# Patient Record
Sex: FEMALE | Race: WHITE
[De-identification: names, ages, dates, MRNs, and addresses within clinical notes are randomized per-mention and may not be internally consistent; named-entity substitution may affect disease eponyms.]

---

## 2019-04-15 ENCOUNTER — HOSPITAL ENCOUNTER (OUTPATIENT)
Dept: HOSPITAL 75 - RAD | Age: 19
End: 2019-04-15
Attending: NURSE PRACTITIONER
Payer: COMMERCIAL

## 2019-04-15 DIAGNOSIS — N63.10: Primary | ICD-10-CM

## 2019-04-15 NOTE — DIAGNOSTIC IMAGING REPORT
INDICATION: Palpable fullness in the outer right breast.



Sonographic interrogation of the area of palpable abnormality in

the outer right breast was performed.



 No sonographic abnormality is seen. No solid or cystic mass is

detected.



IMPRESSION: BI-RADS category one



No sonographic abnormality is identified. Continued close

clinical and self breast exam is recommended to confirm stability

of the palpable abnormality.



ACR BI-RADS Category 0: Incomplete. (Needs additional imaging

evaluation).



Dictated by: 



  Dictated on workstation # [id number]

## 2020-09-22 ENCOUNTER — HOSPITAL ENCOUNTER (OUTPATIENT)
Dept: HOSPITAL 75 - LDRP | Age: 20
Discharge: HOME | End: 2020-09-22
Attending: FAMILY MEDICINE
Payer: COMMERCIAL

## 2020-09-22 VITALS — BODY MASS INDEX: 33.36 KG/M2 | WEIGHT: 212.53 LBS | HEIGHT: 67.01 IN

## 2020-09-22 VITALS — SYSTOLIC BLOOD PRESSURE: 124 MMHG | DIASTOLIC BLOOD PRESSURE: 74 MMHG

## 2020-09-22 DIAGNOSIS — O36.8121: Primary | ICD-10-CM

## 2020-09-22 DIAGNOSIS — Z3A.27: ICD-10-CM

## 2020-09-22 PROCEDURE — 99212 OFFICE O/P EST SF 10 MIN: CPT

## 2020-09-22 NOTE — NUR
DISCHARGE PACKET GIVEN AND EXPLAINED UNDERSTANDING VOICED, NO SS DISTRESS, PT AMBULATORY OFF 
UNIT AT THIS TIME ACCOMPANIED BY MOTHER.

## 2020-09-22 NOTE — NUR
HAI ROLDAN presented to unit via ambulatory from ED, accompanied by mother, with c/o 
DECREASED FETAL MOVEMENT. HAI ROLDAN weighed, gowned, voided, and to bed.  EFHM and 
TOCO applied, VS taken.  HAI ROLDAN oriented to bed controls, call light, TV, heat, and 
A/C controls.  further asesments per this rn.

## 2020-09-23 NOTE — PHYSICIAN QUERY-FINAL DX
KARLA KING 9/23/20 0813:


Clinic Account Progress/Dx


Physician Query:


Please give diagnosis





Please include # weeks gestation


Date of Service





Sep 22, 2020 at 20:31





LUZ GALVAN DO 9/23/20 0957:


Clinic Account Progress/Dx


DIAGNOSIS:


Diagnosis


27 week GA


Decreased fetal movement; reassuring fetal tracing











KARLA KING                    Sep 23, 2020 08:13


LUZ GALVAN DO                Sep 23, 2020 09:57

## 2020-11-15 ENCOUNTER — HOSPITAL ENCOUNTER (OUTPATIENT)
Dept: HOSPITAL 75 - LDRP | Age: 20
Discharge: HOME | End: 2020-11-15
Attending: FAMILY MEDICINE
Payer: COMMERCIAL

## 2020-11-15 VITALS — DIASTOLIC BLOOD PRESSURE: 77 MMHG | SYSTOLIC BLOOD PRESSURE: 120 MMHG

## 2020-11-15 VITALS — BODY MASS INDEX: 35.02 KG/M2 | WEIGHT: 223.11 LBS | HEIGHT: 67.01 IN

## 2020-11-15 DIAGNOSIS — O23.43: Primary | ICD-10-CM

## 2020-11-15 DIAGNOSIS — Z3A.35: ICD-10-CM

## 2020-11-15 LAB
APTT PPP: YELLOW S
BACTERIA #/AREA URNS HPF: (no result) /HPF
BILIRUB UR QL STRIP: NEGATIVE
FIBRINOGEN PPP-MCNC: (no result) MG/DL
GLUCOSE UR STRIP-MCNC: NEGATIVE MG/DL
KETONES UR QL STRIP: NEGATIVE
LEUKOCYTE ESTERASE UR QL STRIP: (no result)
NITRITE UR QL STRIP: POSITIVE
PH UR STRIP: 6.5 [PH] (ref 5–9)
PROT UR QL STRIP: NEGATIVE
RBC #/AREA URNS HPF: (no result) /HPF
SP GR UR STRIP: 1.02 (ref 1.02–1.02)
SQUAMOUS #/AREA URNS HPF: (no result) /HPF
WBC #/AREA URNS HPF: (no result) /HPF

## 2020-11-15 PROCEDURE — 81000 URINALYSIS NONAUTO W/SCOPE: CPT

## 2020-11-15 PROCEDURE — 96372 THER/PROPH/DIAG INJ SC/IM: CPT

## 2020-11-15 PROCEDURE — 87186 SC STD MICRODIL/AGAR DIL: CPT

## 2020-11-15 PROCEDURE — 99213 OFFICE O/P EST LOW 20 MIN: CPT

## 2020-11-15 PROCEDURE — 87077 CULTURE AEROBIC IDENTIFY: CPT

## 2020-11-15 PROCEDURE — 87088 URINE BACTERIA CULTURE: CPT

## 2020-11-15 NOTE — NUR
HAI ROLDAN presented to unit via  from ED, accompanied by family, with c/o VAG BLEEDING 
35 0/7 wks . HAI ROLDAN weighed, gowned, voided, and to bed.  EFHM and TOCO applied, VS 
taken.  HAI ROLDAN oriented to bed controls, call light, TV, heat, and A/C controls.

## 2020-11-15 NOTE — NUR
Discharge packet given and explained, understanding voiced, pt changing for dismissal at 
this time.

## 2020-11-16 NOTE — PHYSICIAN QUERY-FINAL DX
KARLA KING 11/16/20 0833:


Clinic Account Progress/Dx


Physician Query:


Please give diagnosis





Please include # weeks gestation


Date of Service





Nov 15, 2020 at 21:03





LUZ GALVAN DO 11/16/20 1538:


Clinic Account Progress/Dx


DIAGNOSIS:


Diagnosis


35 week


UTI











KARLA KING                    Nov 16, 2020 08:33


LUZ GALVAN DO                Nov 16, 2020 15:38

## 2020-12-17 ENCOUNTER — HOSPITAL ENCOUNTER (INPATIENT)
Dept: HOSPITAL 75 - WSO | Age: 20
LOS: 1 days | Discharge: HOME | End: 2020-12-18
Attending: FAMILY MEDICINE | Admitting: FAMILY MEDICINE
Payer: COMMERCIAL

## 2020-12-17 VITALS — DIASTOLIC BLOOD PRESSURE: 59 MMHG | SYSTOLIC BLOOD PRESSURE: 126 MMHG

## 2020-12-17 VITALS — DIASTOLIC BLOOD PRESSURE: 72 MMHG | SYSTOLIC BLOOD PRESSURE: 119 MMHG

## 2020-12-17 VITALS — SYSTOLIC BLOOD PRESSURE: 117 MMHG | DIASTOLIC BLOOD PRESSURE: 57 MMHG

## 2020-12-17 VITALS — DIASTOLIC BLOOD PRESSURE: 60 MMHG | SYSTOLIC BLOOD PRESSURE: 118 MMHG

## 2020-12-17 VITALS — SYSTOLIC BLOOD PRESSURE: 111 MMHG | DIASTOLIC BLOOD PRESSURE: 53 MMHG

## 2020-12-17 VITALS — DIASTOLIC BLOOD PRESSURE: 66 MMHG | SYSTOLIC BLOOD PRESSURE: 124 MMHG

## 2020-12-17 VITALS — SYSTOLIC BLOOD PRESSURE: 131 MMHG | DIASTOLIC BLOOD PRESSURE: 61 MMHG

## 2020-12-17 VITALS — SYSTOLIC BLOOD PRESSURE: 137 MMHG | DIASTOLIC BLOOD PRESSURE: 78 MMHG

## 2020-12-17 VITALS — SYSTOLIC BLOOD PRESSURE: 113 MMHG | DIASTOLIC BLOOD PRESSURE: 62 MMHG

## 2020-12-17 VITALS — SYSTOLIC BLOOD PRESSURE: 114 MMHG | DIASTOLIC BLOOD PRESSURE: 56 MMHG

## 2020-12-17 VITALS — DIASTOLIC BLOOD PRESSURE: 60 MMHG | SYSTOLIC BLOOD PRESSURE: 112 MMHG

## 2020-12-17 VITALS — DIASTOLIC BLOOD PRESSURE: 74 MMHG | SYSTOLIC BLOOD PRESSURE: 125 MMHG

## 2020-12-17 VITALS — DIASTOLIC BLOOD PRESSURE: 71 MMHG | SYSTOLIC BLOOD PRESSURE: 131 MMHG

## 2020-12-17 VITALS — SYSTOLIC BLOOD PRESSURE: 118 MMHG | DIASTOLIC BLOOD PRESSURE: 58 MMHG

## 2020-12-17 VITALS — SYSTOLIC BLOOD PRESSURE: 127 MMHG | DIASTOLIC BLOOD PRESSURE: 61 MMHG

## 2020-12-17 VITALS — BODY MASS INDEX: 35.09 KG/M2 | HEIGHT: 67.01 IN | WEIGHT: 223.55 LBS

## 2020-12-17 VITALS — DIASTOLIC BLOOD PRESSURE: 70 MMHG | SYSTOLIC BLOOD PRESSURE: 104 MMHG

## 2020-12-17 VITALS — SYSTOLIC BLOOD PRESSURE: 123 MMHG | DIASTOLIC BLOOD PRESSURE: 73 MMHG

## 2020-12-17 VITALS — SYSTOLIC BLOOD PRESSURE: 121 MMHG | DIASTOLIC BLOOD PRESSURE: 58 MMHG

## 2020-12-17 VITALS — DIASTOLIC BLOOD PRESSURE: 73 MMHG | SYSTOLIC BLOOD PRESSURE: 113 MMHG

## 2020-12-17 VITALS — DIASTOLIC BLOOD PRESSURE: 59 MMHG | SYSTOLIC BLOOD PRESSURE: 112 MMHG

## 2020-12-17 VITALS — DIASTOLIC BLOOD PRESSURE: 57 MMHG | SYSTOLIC BLOOD PRESSURE: 121 MMHG

## 2020-12-17 VITALS — SYSTOLIC BLOOD PRESSURE: 120 MMHG | DIASTOLIC BLOOD PRESSURE: 63 MMHG

## 2020-12-17 VITALS — SYSTOLIC BLOOD PRESSURE: 107 MMHG | DIASTOLIC BLOOD PRESSURE: 58 MMHG

## 2020-12-17 VITALS — DIASTOLIC BLOOD PRESSURE: 81 MMHG | SYSTOLIC BLOOD PRESSURE: 134 MMHG

## 2020-12-17 VITALS — DIASTOLIC BLOOD PRESSURE: 52 MMHG | SYSTOLIC BLOOD PRESSURE: 108 MMHG

## 2020-12-17 VITALS — SYSTOLIC BLOOD PRESSURE: 112 MMHG | DIASTOLIC BLOOD PRESSURE: 62 MMHG

## 2020-12-17 VITALS — DIASTOLIC BLOOD PRESSURE: 86 MMHG | SYSTOLIC BLOOD PRESSURE: 136 MMHG

## 2020-12-17 VITALS — SYSTOLIC BLOOD PRESSURE: 127 MMHG | DIASTOLIC BLOOD PRESSURE: 59 MMHG

## 2020-12-17 VITALS — SYSTOLIC BLOOD PRESSURE: 106 MMHG | DIASTOLIC BLOOD PRESSURE: 66 MMHG

## 2020-12-17 VITALS — SYSTOLIC BLOOD PRESSURE: 117 MMHG | DIASTOLIC BLOOD PRESSURE: 60 MMHG

## 2020-12-17 VITALS — DIASTOLIC BLOOD PRESSURE: 60 MMHG | SYSTOLIC BLOOD PRESSURE: 115 MMHG

## 2020-12-17 VITALS — DIASTOLIC BLOOD PRESSURE: 72 MMHG | SYSTOLIC BLOOD PRESSURE: 144 MMHG

## 2020-12-17 VITALS — DIASTOLIC BLOOD PRESSURE: 57 MMHG | SYSTOLIC BLOOD PRESSURE: 119 MMHG

## 2020-12-17 VITALS — DIASTOLIC BLOOD PRESSURE: 59 MMHG | SYSTOLIC BLOOD PRESSURE: 121 MMHG

## 2020-12-17 VITALS — DIASTOLIC BLOOD PRESSURE: 62 MMHG | SYSTOLIC BLOOD PRESSURE: 116 MMHG

## 2020-12-17 VITALS — DIASTOLIC BLOOD PRESSURE: 64 MMHG | SYSTOLIC BLOOD PRESSURE: 114 MMHG

## 2020-12-17 DIAGNOSIS — Z20.828: ICD-10-CM

## 2020-12-17 DIAGNOSIS — Z3A.39: ICD-10-CM

## 2020-12-17 LAB
BASOPHILS # BLD AUTO: 0 10^3/UL (ref 0–0.1)
BASOPHILS NFR BLD AUTO: 0 % (ref 0–10)
EOSINOPHIL # BLD AUTO: 0.1 10^3/UL (ref 0–0.3)
EOSINOPHIL NFR BLD AUTO: 1 % (ref 0–10)
HCT VFR BLD CALC: 34 % (ref 35–52)
HGB BLD-MCNC: 10.5 G/DL (ref 11.5–16)
LYMPHOCYTES # BLD AUTO: 1.7 10^3/UL (ref 1–4)
LYMPHOCYTES NFR BLD AUTO: 13 % (ref 12–44)
MANUAL DIFFERENTIAL PERFORMED BLD QL: NO
MCH RBC QN AUTO: 27 PG (ref 25–34)
MCHC RBC AUTO-ENTMCNC: 31 G/DL (ref 32–36)
MCV RBC AUTO: 86 FL (ref 80–99)
MONOCYTES # BLD AUTO: 0.7 10^3/UL (ref 0–1)
MONOCYTES NFR BLD AUTO: 5 % (ref 0–12)
NEUTROPHILS # BLD AUTO: 10.8 10^3/UL (ref 1.8–7.8)
NEUTROPHILS NFR BLD AUTO: 81 % (ref 42–75)
PLATELET # BLD: 316 10^3/UL (ref 130–400)
PMV BLD AUTO: 10 FL (ref 9–12.2)
WBC # BLD AUTO: 13.4 10^3/UL (ref 4.3–11)

## 2020-12-17 PROCEDURE — 86900 BLOOD TYPING SEROLOGIC ABO: CPT

## 2020-12-17 PROCEDURE — 85027 COMPLETE CBC AUTOMATED: CPT

## 2020-12-17 PROCEDURE — 86850 RBC ANTIBODY SCREEN: CPT

## 2020-12-17 PROCEDURE — 90707 MMR VACCINE SC: CPT

## 2020-12-17 PROCEDURE — 86901 BLOOD TYPING SEROLOGIC RH(D): CPT

## 2020-12-17 PROCEDURE — 90715 TDAP VACCINE 7 YRS/> IM: CPT

## 2020-12-17 PROCEDURE — 85025 COMPLETE CBC W/AUTO DIFF WBC: CPT

## 2020-12-17 PROCEDURE — 87635 SARS-COV-2 COVID-19 AMP PRB: CPT

## 2020-12-17 PROCEDURE — 10907ZC DRAINAGE OF AMNIOTIC FLUID, THERAPEUTIC FROM PRODUCTS OF CONCEPTION, VIA NATURAL OR ARTIFICIAL OPENING: ICD-10-PCS | Performed by: FAMILY MEDICINE

## 2020-12-17 PROCEDURE — 36415 COLL VENOUS BLD VENIPUNCTURE: CPT

## 2020-12-17 PROCEDURE — 99212 OFFICE O/P EST SF 10 MIN: CPT

## 2020-12-17 PROCEDURE — 0KQM0ZZ REPAIR PERINEUM MUSCLE, OPEN APPROACH: ICD-10-PCS | Performed by: FAMILY MEDICINE

## 2020-12-17 RX ADMIN — IBUPROFEN SCH MG: 600 TABLET ORAL at 12:37

## 2020-12-17 RX ADMIN — Medication SCH MLS/HR: at 13:05

## 2020-12-17 RX ADMIN — IBUPROFEN SCH MG: 600 TABLET ORAL at 18:21

## 2020-12-17 RX ADMIN — Medication SCH MLS/HR: at 13:06

## 2020-12-17 RX ADMIN — DOCUSATE SODIUM SCH MG: 100 CAPSULE ORAL at 20:50

## 2020-12-17 RX ADMIN — ACETAMINOPHEN SCH MG: 500 TABLET ORAL at 15:40

## 2020-12-17 RX ADMIN — ACETAMINOPHEN SCH MG: 500 TABLET ORAL at 20:50

## 2020-12-17 RX ADMIN — IBUPROFEN SCH MG: 600 TABLET ORAL at 23:49

## 2020-12-17 NOTE — HISTORY & PHYSICAL-OB
OB - Chief Complaint & HPI


Date/Time


Date of Admission:


Date of Admission:  Dec 17, 2020 at 02:26


Date seen by a Provider:  Dec 17, 2020


Time Seen by a Provider:  07:55





Chief Complaint/History


OB-Reason for Admission/Chief:  Onset of Labor


Hx :  1


Hx Para:  0


Expected Date of Delivery:  Dec 20, 2020


Gestational Age in Weeks:  39


Gestational Age in Days:  4


History of Labs


A+, Ab neg, Rub Imm, HIV/RPR/HepB NR, 1 hr GTT, GBS Neg





Allergies and Home Medications


Allergies


Coded Allergies:  


     Penicillins (Verified  Allergy, Mild, 20)





Home Medications


Cetirizine HCl 10 Mg Tablet, 10 MG PO BID PRN, (Reported)


Prenatal Vit W-Ca,Fe,FA(<1 mg) 1 Each Tablet, 1 EACH PO DAILY, (Reported)





Patient Home Medication List


Home Medication List Reviewed:  Yes





OB - History


Hx of Present Pregnancy


Prenatal Care:  Yes


Ultrasounds:  No ultrasounds


Obstetrical Complications:  None


Medical Complications:  None





Obstetrical History


Hx :  1


Hx Para:  0





Patient Past Medical History





None





Social History/Family History


HIV/AIDS:  No


Recent Infectious Disease Expo:  No


Sexually Transmitted Disease:  No


Alcohol Use:  Denies Use


Recreational Drug Use:  No


2nd Hand Smoke Exposure:  No





Immunizations


Tetanus Booster (TDap):  Less than 5yrs


Date of Influenza Vaccine:  Oct 15, 2020


Rubella:  immune


RPR/VDRL:  Negative


GBS Status:  Negative


HBsAG:  Negative





OB - Admission Exam


Physical Exam


Vitals:





Vital Signs








 20





 01:20 04:20 04:40 07:00


 


Temp   36.4 


 


Pulse    91


 


Resp  18  


 


B/P (MAP)    121/57 (78)


 


Pulse Ox    98


 


O2 Delivery Room Air   








HEENT:  NCAT


Heart:  Rhythm Normal


Lungs:  Clear


Abdomen:  Gravid


Extremities:  Normal


Reflexes:  Normal


Cervical Dilatation:  9cm


Effacement:  100%


Station:  +1


Membranes:  Intact


Fetal Heart Rate:  140's


Accelerations:  Accelerations Present


Short Term Variability:  Present


Long Term Variability:  Average (6-25)


Contractions on Admission:  < 5 Minutes Apart


Intensity:  Firm


Labs





Laboratory Tests








Test


 20


02:35 20


03:20 Range/Units


 


 


White Blood Count


 13.4 H


 


 4.3-11.0


10^3/uL


 


Red Blood Count


 3.92 


 


 3.80-5.11


10^6/uL


 


Hemoglobin 10.5 L  11.5-16.0  g/dL


 


Hematocrit 34 L  35-52  %


 


Mean Corpuscular Volume 86   80-99  fL


 


Mean Corpuscular Hemoglobin 27   25-34  pg


 


Mean Corpuscular Hemoglobin


Concent 31 L


 


 32-36  g/dL





 


Red Cell Distribution Width 14.6 H  10.0-14.5  %


 


Platelet Count


 316 


 


 130-400


10^3/uL


 


Mean Platelet Volume 10.0   9.0-12.2  fL


 


Immature Granulocyte % (Auto) 0    %


 


Neutrophils (%) (Auto) 81 H  42-75  %


 


Lymphocytes (%) (Auto) 13   12-44  %


 


Monocytes (%) (Auto) 5   0-12  %


 


Eosinophils (%) (Auto) 1   0-10  %


 


Basophils (%) (Auto) 0   0-10  %


 


Neutrophils # (Auto)


 10.8 H


 


 1.8-7.8


10^3/uL


 


Lymphocytes # (Auto)


 1.7 


 


 1.0-4.0


10^3/uL


 


Monocytes # (Auto)


 0.7 


 


 0.0-1.0


10^3/uL


 


Eosinophils # (Auto)


 0.1 


 


 0.0-0.3


10^3/uL


 


Basophils # (Auto)


 0.0 


 


 0.0-0.1


10^3/uL


 


Immature Granulocyte # (Auto)


 0.1 


 


 0.0-0.1


10^3/uL











OB - Assessment/Plan/Diagnosis


Assessment


Assessment:  active labor


Admission Dx


Active Labor


39 week Gestation


Third Trimester pregnancy


Admission Status:  Inpatient Order (span 2 midnights)


Reason for Inpatient Admission:  


Labor





Plan


Plan:  Expectant Management


Other Plan


19 yo G1 @ 39.3 wga here in active labor





Plan


- Expectant Management


- AROM Clear 0800


- Epidural for pain control


- GBS Neg











SARAH FERREIRA MD               Dec 17, 2020 08:15

## 2020-12-17 NOTE — NUR
Pt up to bathroom. Had small void. +hodan care, new gown, panties and liner applied. Pt to 
wheelchair. Belongings gathered. Scheduled med given.

## 2020-12-17 NOTE — NUR
Vital signs taken. Scheduled meds given. Pt up to bathroom, states having no pain. No 
further needs at this time.

## 2020-12-17 NOTE — NUR
Pt transferred from ws-320 to ws-309 via wheel chair in stable condition accompanied by this 
rn, infant, belongings and grandmother of baby. Pt oriented to room. Postpartum packet 
oriented to pt. Pt verbalizes understanding. Fresh ice water given. No further needs at this 
time.

## 2020-12-17 NOTE — OB LABOR & DELIVERY RECORD
Vag Delivery Note


Vag Delivery Note


Date of Delivery: 20 





Preoperative Diagnosis: Alexia Espinal  is a (20  /Para  1 / 0,Gestational

Age (wks)39.3 wga here in active labor





Postoperative Diagnosis: Same


Surgeon: SARAH FERREIRA 





Assistant: Russ Clement MS3





Anesthesia: Epidural





Delivery Type:  @0924





Findings: 


Viable female infant, apgars 8/9, weight 7#2, 3220 grams


Lacerations: 2nd degree laceration


Intact placenta with 3 vessel cord. No nuchal cord, body cord or shoulder 

dystocia





Estimated Blood Loss: 125 ml





Complications: None





Condition: Stable





Description of Procedure:





The patient is a 20 year old female who presented in active labor. She was 

admitted and informed consent was obtained. Her labor course was unremarkable. 

She progressed to complete dilatation and began to push. 





She was then set up for delivery. The infant's head was delivered atraumatically

in the ZULEMA position. The shoulders and remainder of the infant's body were then 

delivered without difficulty. Upon delivery, the head was held below the level 

of the perineum and the mouth and nares were bulb suctioned. The cord was doubly

clamped and cut after 4 min delay and the infant was attended to by the 

pediatric staff. An intact placenta with 3-vessel cord delivered via [place] and

there was found to be minimal bleeding.~ Vigorous fundal massage was performed 

and the fundus was found to be firm. IV oxytocin was given. Examination of the 

vagina and perineum revealed a 2nd laceration repaired in the usual fashion with

3-0 Rapid suture. Following the repair, sponge, instrument and needle counts 

were correct. Mom and baby were both in stable condition in the labor suite.





Vitals - Labs


Vital Signs - I&O





Vital Signs








  Date Time  Temp Pulse Resp B/P (MAP) Pulse Ox O2 Delivery O2 Flow Rate FiO2


 


20 07:00  91  121/57 (78) 98   


 


20 06:45  95  112/60 (77) 97   


 


20 06:30  81  107/58 (74) 97   


 


20 06:15  87  112/59 (76) 97   


 


20 06:00  84  114/64 (81) 97   


 


20 05:45  81  116/62 (80) 97   


 


20 05:30  86  118/60 (79) 97   


 


20 05:15  80  119/57 (77) 98   


 


20 05:00  86  125/74 (91) 97   


 


20 04:53  85  117/57 (77) 98   


 


20 04:48  82  121/58 (79) 98   


 


20 04:45        


 


20 04:43  88  126/59 (81) 97   


 


20 04:40 36.4 87  127/59 (81) 98   


 


20 04:34  93  131/71 (91) 97   


 


20 04:30  97  137/78 (97) 99   


 


20 04:20  98 18 136/86 (103) 98   


 


20 01:20 36.8 96 20  99 Room Air  














I & O 


 


 20





 07:00


 


Intake Total 1000 ml


 


Balance 1000 ml











Labs


Laboratory Tests


20 02:35: 


White Blood Count 13.4H, Red Blood Count 3.92, Hemoglobin 10.5L, Hematocrit 34L,

Mean Corpuscular Volume 86, Mean Corpuscular Hemoglobin 27, Mean Corpuscular 

Hemoglobin Concent 31L, Red Cell Distribution Width 14.6H, Platelet Count 316, 

Mean Platelet Volume 10.0, Immature Granulocyte % (Auto) 0, Neutrophils (%) (Aut

o) 81H, Lymphocytes (%) (Auto) 13, Monocytes (%) (Auto) 5, Eosinophils (%) 

(Auto) 1, Basophils (%) (Auto) 0, Neutrophils # (Auto) 10.8H, Lymphocytes # 

(Auto) 1.7, Monocytes # (Auto) 0.7, Eosinophils # (Auto) 0.1, Basophils # (Auto)

0.0, Immature Granulocyte # (Auto) 0.1


20 03:20: 











SARAH FERREIRA MD               Dec 17, 2020 10:22

## 2020-12-17 NOTE — NUR
RN to room for assessment. VSS. Pt denies passing any clots and has minimal bleeding, 
reports no pain. Pt denies any needs or concerns at this time

## 2020-12-17 NOTE — NUR
HAI ROLDAN presented to unit via AMBULATORY  from ED, accompanied by ADULT FEMALE, with 
c/o CONTRACTIONS. HAI ROLDAN weighed, gowned, voided, and to bed.  EFHM and TOCO 
applied, VS taken.  HAI ROLDAN oriented to bed controls, call light, TV, heat, and A/C 
controls.  ABOVE AND FURTHER ASSESSMENTS COMPLETED PER KIRA KEE.

## 2020-12-18 VITALS — DIASTOLIC BLOOD PRESSURE: 54 MMHG | SYSTOLIC BLOOD PRESSURE: 97 MMHG

## 2020-12-18 VITALS — DIASTOLIC BLOOD PRESSURE: 71 MMHG | SYSTOLIC BLOOD PRESSURE: 109 MMHG

## 2020-12-18 LAB
BASOPHILS # BLD AUTO: 0 10^3/UL (ref 0–0.1)
BASOPHILS NFR BLD AUTO: 0 % (ref 0–10)
EOSINOPHIL # BLD AUTO: 0.1 10^3/UL (ref 0–0.3)
EOSINOPHIL NFR BLD AUTO: 1 % (ref 0–10)
HCT VFR BLD CALC: 31 % (ref 35–52)
HGB BLD-MCNC: 9.2 G/DL (ref 11.5–16)
LYMPHOCYTES # BLD AUTO: 2.2 10^3/UL (ref 1–4)
LYMPHOCYTES NFR BLD AUTO: 22 % (ref 12–44)
MANUAL DIFFERENTIAL PERFORMED BLD QL: NO
MCH RBC QN AUTO: 27 PG (ref 25–34)
MCHC RBC AUTO-ENTMCNC: 30 G/DL (ref 32–36)
MCV RBC AUTO: 90 FL (ref 80–99)
MONOCYTES # BLD AUTO: 0.6 10^3/UL (ref 0–1)
MONOCYTES NFR BLD AUTO: 6 % (ref 0–12)
NEUTROPHILS # BLD AUTO: 6.8 10^3/UL (ref 1.8–7.8)
NEUTROPHILS NFR BLD AUTO: 70 % (ref 42–75)
PLATELET # BLD: 245 10^3/UL (ref 130–400)
PMV BLD AUTO: 10.1 FL (ref 9–12.2)
WBC # BLD AUTO: 9.8 10^3/UL (ref 4.3–11)

## 2020-12-18 RX ADMIN — IBUPROFEN SCH MG: 600 TABLET ORAL at 06:01

## 2020-12-18 RX ADMIN — IBUPROFEN SCH MG: 600 TABLET ORAL at 12:36

## 2020-12-18 RX ADMIN — DOCUSATE SODIUM SCH MG: 100 CAPSULE ORAL at 09:06

## 2020-12-18 NOTE — NUR
Discharge instructions given to pt. Appointment card and PHS given to pt. Pt verbalizes 
understanding. Pt signs that discharge instructions were given. No further needs at this 
time.

## 2020-12-18 NOTE — SHORT STAY SUMMARY
Discharge Summary


Hospital Course


Problems/Dx:  


(1) Status post vaginal delivery


Assessment & Plan:   at 39w3d following SOL


2nd degree laceration repair


Routine post-partum course.





Final Diagnosis:  see Problem List


Hospital Course


Date of Admission: Dec 17, 2020 at 02:26 


Date of Discharge: 20 











Labs and Pending Lab Test:


Laboratory Tests


20 06:15: 


White Blood Count 9.8, Red Blood Count 3.44L, Hemoglobin 9.2L, Hematocrit 31L, 

Mean Corpuscular Volume 90, Mean Corpuscular Hemoglobin 27, Mean Corpuscular 

Hemoglobin Concent 30L, Red Cell Distribution Width 14.8H, Platelet Count 245, 

Mean Platelet Volume 10.1, Immature Granulocyte % (Auto) 1, Neutrophils (%) 

(Auto) 70, Lymphocytes (%) (Auto) 22, Monocytes (%) (Auto) 6, Eosinophils (%) 

(Auto) 1, Basophils (%) (Auto) 0, Neutrophils # (Auto) 6.8, Lymphocytes # (Auto)

2.2, Monocytes # (Auto) 0.6, Eosinophils # (Auto) 0.1, Basophils # (Auto) 0.0, 

Immature Granulocyte # (Auto) 0.1





Home Meds


Active


Reported


Zyrtec (Cetirizine HCl) 10 Mg Tablet 10 Mg PO BID PRN


Prenatal Formula (Prenatal Vit W-Ca,Fe,FA(<1 mg)) 1 Each Tablet 1 Each PO DAILY


Assessment/Pt Instructions


follow-up with Dr. Chadwick in 6wk





Discharge Instructions


Discharge Diet:  No Restrictions





Discharge Physical Examination


General Appearance:  Alert, Oriented X3, Cooperative


Psych/Mental Status:  Mood NL


Allergies:  


Coded Allergies:  


     Penicillins (Verified  Allergy, Mild, 20)





Discharge Summary


Date of Admission


Dec 17, 2020 at 02:26


Date of Discharge








Clinical Quality Measures


DVT/VTE Risk/Contraindication:


Risk Factor Score Per Nursin


RFS Level Per Nursing on Admit:  1=Low/No VTE PPX











LUZ GALVAN DO                Dec 18, 2020 12:21

## 2020-12-18 NOTE — ANESTHESIA-REGIONAL POST-OP
Regional


Patient Condition


Mental Status:  Alert, Oriented x3


Circulation:  Same as Pre-Op


Headache:  Absent


Sensation:  Full Recovery


Motor Block:  Absent





Post Op Complications


Complications


None





Follow Up Care/Instructions


Patient Instructions


None needed.





Anesthesia/Patient Condition


Patient is doing well, no complaints, stable vital signs, no apparent adverse 

anesthesia problems.   


No complications reported per nursing.


D/C home per [facility] Criteria:  No











TRISTON BALDERAS CRNA           Dec 18, 2020 06:49

## 2020-12-18 NOTE — NUR
Pt discharged from unit in stable condition via ambulatory accompanied by this rn, pts 
mother, and infant.